# Patient Record
Sex: MALE | Race: OTHER
[De-identification: names, ages, dates, MRNs, and addresses within clinical notes are randomized per-mention and may not be internally consistent; named-entity substitution may affect disease eponyms.]

---

## 2019-11-26 ENCOUNTER — HOSPITAL ENCOUNTER (EMERGENCY)
Dept: HOSPITAL 54 - ER | Age: 39
Discharge: HOME | End: 2019-11-26
Payer: MEDICAID

## 2019-11-26 VITALS — SYSTOLIC BLOOD PRESSURE: 126 MMHG | DIASTOLIC BLOOD PRESSURE: 84 MMHG

## 2019-11-26 VITALS — HEIGHT: 70 IN | BODY MASS INDEX: 22.19 KG/M2 | WEIGHT: 155 LBS
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
## 2019-11-26 DIAGNOSIS — S62.630A: Primary | ICD-10-CM
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
## 2019-11-26 NOTE — NUR
SPLINT APPLIED TO RIGHT INDEX FINGER. Patient discharged to home in stable 
condition. Written and verbal after care instructions given. Patient verbalizes 
understanding of instruction.

## 2019-11-26 NOTE — NUR
R INDEX FINGER PAIN S/P CATCHING A FAST BASKETBALL. PATIENT A/OX4, BREATHING 
EVEN AND UNLABORED, KEPT COMFORTABLE. MD AT BEDSIDE FOR EVAL.

## 2023-09-09 ENCOUNTER — HOSPITAL ENCOUNTER (EMERGENCY)
Dept: HOSPITAL 54 - ER | Age: 43
Discharge: HOME | End: 2023-09-09
Payer: MEDICAID

## 2023-09-09 VITALS — DIASTOLIC BLOOD PRESSURE: 87 MMHG | SYSTOLIC BLOOD PRESSURE: 127 MMHG | OXYGEN SATURATION: 98 % | TEMPERATURE: 98.4 F

## 2023-09-09 VITALS — WEIGHT: 185 LBS | BODY MASS INDEX: 26.48 KG/M2 | HEIGHT: 70 IN
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
## 2023-09-09 DIAGNOSIS — F10.129: ICD-10-CM
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
## 2023-09-09 DIAGNOSIS — Z60.2: ICD-10-CM
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
## 2023-09-09 DIAGNOSIS — S00.03XA: Primary | ICD-10-CM
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
## 2023-09-09 SDOH — SOCIAL STABILITY - SOCIAL INSECURITY: PROBLEMS RELATED TO LIVING ALONE: Z60.2
